# Patient Record
Sex: MALE | URBAN - METROPOLITAN AREA
[De-identification: names, ages, dates, MRNs, and addresses within clinical notes are randomized per-mention and may not be internally consistent; named-entity substitution may affect disease eponyms.]

---

## 2017-05-12 ENCOUNTER — OFFICE VISIT (OUTPATIENT)
Dept: FAMILY MEDICINE | Facility: CLINIC | Age: 42
End: 2017-05-12
Payer: COMMERCIAL

## 2017-05-12 VITALS
BODY MASS INDEX: 30.05 KG/M2 | HEART RATE: 63 BPM | SYSTOLIC BLOOD PRESSURE: 119 MMHG | WEIGHT: 176 LBS | DIASTOLIC BLOOD PRESSURE: 66 MMHG | TEMPERATURE: 98.3 F | OXYGEN SATURATION: 100 % | HEIGHT: 64 IN

## 2017-05-12 DIAGNOSIS — J06.9 UPPER RESPIRATORY TRACT INFECTION, UNSPECIFIED TYPE: Primary | ICD-10-CM

## 2017-05-12 LAB
BASOPHILS # BLD AUTO: 0 10E9/L (ref 0–0.2)
BASOPHILS NFR BLD AUTO: 1.2 %
DIFFERENTIAL METHOD BLD: ABNORMAL
EOSINOPHIL # BLD AUTO: 0.2 10E9/L (ref 0–0.7)
EOSINOPHIL NFR BLD AUTO: 4.6 %
ERYTHROCYTE [DISTWIDTH] IN BLOOD BY AUTOMATED COUNT: 13.8 % (ref 10–15)
HCT VFR BLD AUTO: 47 % (ref 40–53)
HGB BLD-MCNC: 15.9 G/DL (ref 13.3–17.7)
LYMPHOCYTES # BLD AUTO: 1.5 10E9/L (ref 0.8–5.3)
LYMPHOCYTES NFR BLD AUTO: 44.5 %
MCH RBC QN AUTO: 26.6 PG (ref 26.5–33)
MCHC RBC AUTO-ENTMCNC: 33.8 G/DL (ref 31.5–36.5)
MCV RBC AUTO: 79 FL (ref 78–100)
MONOCYTES # BLD AUTO: 0.4 10E9/L (ref 0–1.3)
MONOCYTES NFR BLD AUTO: 13.5 %
NEUTROPHILS # BLD AUTO: 1.2 10E9/L (ref 1.6–8.3)
NEUTROPHILS NFR BLD AUTO: 36.2 %
PLATELET # BLD AUTO: 209 10E9/L (ref 150–450)
RBC # BLD AUTO: 5.97 10E12/L (ref 4.4–5.9)
WBC # BLD AUTO: 3.3 10E9/L (ref 4–11)

## 2017-05-12 PROCEDURE — 85025 COMPLETE CBC W/AUTO DIFF WBC: CPT | Performed by: FAMILY MEDICINE

## 2017-05-12 PROCEDURE — 99213 OFFICE O/P EST LOW 20 MIN: CPT | Performed by: FAMILY MEDICINE

## 2017-05-12 PROCEDURE — 36415 COLL VENOUS BLD VENIPUNCTURE: CPT | Performed by: FAMILY MEDICINE

## 2017-05-12 RX ORDER — NAPROXEN 500 MG/1
500 TABLET ORAL 2 TIMES DAILY WITH MEALS
Qty: 20 TABLET | Refills: 0 | Status: SHIPPED | OUTPATIENT
Start: 2017-05-12 | End: 2017-09-27

## 2017-05-12 RX ORDER — NAPROXEN 500 MG/1
500 TABLET ORAL 2 TIMES DAILY WITH MEALS
Qty: 20 TABLET | Refills: 0 | Status: SHIPPED | OUTPATIENT
Start: 2017-05-12 | End: 2017-05-12

## 2017-05-12 NOTE — PATIENT INSTRUCTIONS
1. Your blood test showed low white blood cell count. This is common with a viral infection.    2. It should get better with time.    3. Take Naproxen 500 mg twice per day for 5 days.    4. Let me know if not better in one week.    5. Come back to see me for a physical without eating or drinking anything other than water.

## 2017-05-12 NOTE — NURSING NOTE
"Chief Complaint   Patient presents with     Cough     x 4 days     Arthritis     joint pain     Flank Pain       Initial /66 (Cuff Size: Adult Regular)  Pulse 63  Temp 98.3  F (36.8  C) (Oral)  Ht 5' 3.5\" (1.613 m)  Wt 176 lb (79.8 kg)  SpO2 100%  BMI 30.69 kg/m2 Estimated body mass index is 30.69 kg/(m^2) as calculated from the following:    Height as of this encounter: 5' 3.5\" (1.613 m).    Weight as of this encounter: 176 lb (79.8 kg).  Medication Reconciliation: complete   Staff was masked during visit.    Blaire Beach LPN    "

## 2017-05-12 NOTE — MR AVS SNAPSHOT
After Visit Summary   5/12/2017    Aubree Le    MRN: [id number]           Patient Information     Date Of Birth          1975        Visit Information        Provider Department      5/12/2017 11:10 AM Kareem Charles MD [facility]        Today's Diagnoses     Upper respiratory tract infection, unspecified type    -  1      Care Instructions    1. Your blood test showed low white blood cell count. This is common with a viral infection.    2. It should get better with time.    3. Take Naproxen 500 mg twice per day for 5 days.    4. Let me know if not better in one week.    5. Come back to see me for a physical without eating or drinking anything other than water.        Follow-ups after your visit        Your next 10 appointments already scheduled     May 12, 2017 11:10 AM CDT   Office Visit with Kareem Charles MD   [facility]    [address] [facility] [address] [phone]           Bring a current list of meds and any records pertaining to this visit.  For Physicals, please bring immunization records and any forms needing to be filled out.  Please arrive 10 minutes early to complete paperwork.              Who to contact     If you have questions or need follow up information about today's clinic visit or your schedule please contact [facility] directly at [phone].  Normal or non-critical lab and imaging results will be communicated to you by MyChart, letter or phone within 4 business days after the clinic has received the results. If you do not hear from us within 7 days, please contact the clinic through MyChart or phone. If you have a critical or abnormal lab result, we will notify you by phone as soon as possible.  Submit refill requests through [employer] or call your pharmacy and they will forward the refill request to us. Please allow 3 business days for your refill to be completed.           "Additional Information About Your Visit        MyChart Information     [employer] lets you send messages to your doctor, view your test results, renew your prescriptions, schedule appointments and more. To sign up, go to [email or web address] . Click on \"Log in\" on the left side of the screen, which will take you to the Welcome page. Then click on \"Sign up Now\" on the right side of the page.     You will be asked to enter the access code listed below, as well as some personal information. Please follow the directions to create your username and password.     Your access code is: [email or web address]  Expires: 8/10/2017 11:03 AM     Your access code will  in 90 days. If you need help or a new code, please call your [place] clinic or [phone].        Care EveryWhere ID     This is your Care EveryWhere ID. This could be used by other organizations to access your [place] medical records  [id number]        Your Vitals Were     Pulse Temperature Height Pulse Oximetry BMI (Body Mass Index)       63 98.3  F (36.8  C) (Oral) 5' 3.5\" (1.613 m) 100% 30.69 kg/m2        Blood Pressure from Last 3 Encounters:   17 119/66   16 116/77    Weight from Last 3 Encounters:   17 176 lb (79.8 kg)   16 180 lb (81.6 kg)              We Performed the Following     CBC with platelets differential        Primary Care Provider Office Phone # Fax #    Kareem Charles [phone]       [facility] [address] [facility] [address]        Thank you!     Thank you for choosing [facility]  for your care. Our goal is always to provide you with excellent care. Hearing back from our patients is one way we can continue to improve our services. Please take a few minutes to complete the written survey that you may receive in the mail after your visit with us. Thank you!             Your Updated Medication List - Protect others around you: Learn how to safely use, store and throw " away your medicines at www.disposemymeds.org.      Notice  As of 5/12/2017 11:03 AM    You have not been prescribed any medications.

## 2017-05-12 NOTE — PROGRESS NOTES
"HPI:    Aubree Le is an 41 year old male who presents for evaluation of cold symptoms.    Duration: cough started 4 days ago and joint pain started 2 days ago  Fever: no  Cough: Productive of non bloody sputum  Shortness of breath: no  Sore throat: no  Runny nose: yes  Nasal congestion: yes  Ear pain: no  Other symptoms: joint aches without swelling on the elbows and wrists. No rashes.    Results for orders placed or performed in visit on 05/12/17   CBC with platelets differential   Result Value Ref Range    WBC 3.3 (L) 4.0 - 11.0 10e9/L    RBC Count 5.97 (H) 4.4 - 5.9 10e12/L    Hemoglobin 15.9 13.3 - 17.7 g/dL    Hematocrit 47.0 40.0 - 53.0 %    MCV 79 78 - 100 fl    MCH 26.6 26.5 - 33.0 pg    MCHC 33.8 31.5 - 36.5 g/dL    RDW 13.8 10.0 - 15.0 %    Platelet Count 209 150 - 450 10e9/L    Diff Method Automated Method     % Neutrophils 36.2 %    % Lymphocytes 44.5 %    % Monocytes 13.5 %    % Eosinophils 4.6 %    % Basophils 1.2 %    Absolute Neutrophil 1.2 (L) 1.6 - 8.3 10e9/L    Absolute Lymphocytes 1.5 0.8 - 5.3 10e9/L    Absolute Monocytes 0.4 0.0 - 1.3 10e9/L    Absolute Eosinophils 0.2 0.0 - 0.7 10e9/L    Absolute Basophils 0.0 0.0 - 0.2 10e9/L       ROS:    Per HPI    Exam:    /66 (Cuff Size: Adult Regular)  Pulse 63  Temp 98.3  F (36.8  C) (Oral)  Ht 5' 3.5\" (1.613 m)  Wt 176 lb (79.8 kg)  SpO2 100%  BMI 30.69 kg/m2  Gen: Healthy appearing male in no acute distress  ENT: TM's normal. Oropharynx normal. Oral mucosa moist without lesions.  Eyes: Conjunctiva and sclera normal. Pupils react normally to light. No nystagmus.  Neck: No enlarged lymph nodes, thyromegally or other masses.  Lungs: Good air movement and otherwise clear.  CV: Heart RRR with no murmurs. No JVD, carotid bruits or leg edema.  MS: the fingers, wrists and elbows with full ROM without swelling.     Assessment and Plan - Decision Making    1. Upper respiratory tract infection, unspecified type  See below.  - CBC with platelets " differential  - naproxen (NAPROSYN) 500 MG tablet; Take 1 tablet (500 mg) by mouth 2 times daily (with meals)  Dispense: 20 tablet; Refill: 0      Written instructions given as follows:    Patient Instructions   1. Your blood test showed low white blood cell count. This is common with a viral infection.    2. It should get better with time.    3. Take Naproxen 500 mg twice per day for 5 days.    4. Let me know if not better in one week.    5. Come back to see me for a physical without eating or drinking anything other than water.

## 2017-09-27 ENCOUNTER — OFFICE VISIT (OUTPATIENT)
Dept: FAMILY MEDICINE | Facility: CLINIC | Age: 42
End: 2017-09-27
Payer: COMMERCIAL

## 2017-09-27 ENCOUNTER — TELEPHONE (OUTPATIENT)
Dept: FAMILY MEDICINE | Facility: CLINIC | Age: 42
End: 2017-09-27

## 2017-09-27 VITALS
HEART RATE: 61 BPM | SYSTOLIC BLOOD PRESSURE: 116 MMHG | DIASTOLIC BLOOD PRESSURE: 70 MMHG | BODY MASS INDEX: 31.21 KG/M2 | OXYGEN SATURATION: 99 % | WEIGHT: 179 LBS | TEMPERATURE: 98.8 F

## 2017-09-27 DIAGNOSIS — K12.0 CANKER SORES ORAL: Primary | ICD-10-CM

## 2017-09-27 DIAGNOSIS — R10.9 LEFT FLANK PAIN: Primary | ICD-10-CM

## 2017-09-27 DIAGNOSIS — K12.0 CANKER SORES ORAL: ICD-10-CM

## 2017-09-27 PROBLEM — B00.1 COLD SORE: Status: ACTIVE | Noted: 2017-09-27

## 2017-09-27 LAB
ALBUMIN SERPL-MCNC: 4 G/DL (ref 3.4–5)
ALBUMIN UR-MCNC: NEGATIVE MG/DL
ALP SERPL-CCNC: 56 U/L (ref 40–150)
ALT SERPL W P-5'-P-CCNC: 27 U/L (ref 0–70)
ANION GAP SERPL CALCULATED.3IONS-SCNC: 8 MMOL/L (ref 3–14)
APPEARANCE UR: CLEAR
AST SERPL W P-5'-P-CCNC: 17 U/L (ref 0–45)
BASOPHILS # BLD AUTO: 0 10E9/L (ref 0–0.2)
BASOPHILS NFR BLD AUTO: 0.6 %
BILIRUB SERPL-MCNC: 0.5 MG/DL (ref 0.2–1.3)
BILIRUB UR QL STRIP: NEGATIVE
BUN SERPL-MCNC: 12 MG/DL (ref 7–30)
CALCIUM SERPL-MCNC: 9 MG/DL (ref 8.5–10.1)
CHLORIDE SERPL-SCNC: 104 MMOL/L (ref 94–109)
CO2 SERPL-SCNC: 26 MMOL/L (ref 20–32)
COLOR UR AUTO: YELLOW
CREAT SERPL-MCNC: 0.84 MG/DL (ref 0.66–1.25)
DIFFERENTIAL METHOD BLD: ABNORMAL
EOSINOPHIL # BLD AUTO: 0.2 10E9/L (ref 0–0.7)
EOSINOPHIL NFR BLD AUTO: 3.9 %
ERYTHROCYTE [DISTWIDTH] IN BLOOD BY AUTOMATED COUNT: 14 % (ref 10–15)
ERYTHROCYTE [SEDIMENTATION RATE] IN BLOOD BY WESTERGREN METHOD: 7 MM/H (ref 0–15)
GFR SERPL CREATININE-BSD FRML MDRD: >90 ML/MIN/1.7M2
GLUCOSE SERPL-MCNC: 83 MG/DL (ref 70–99)
GLUCOSE UR STRIP-MCNC: NEGATIVE MG/DL
HCT VFR BLD AUTO: 45.2 % (ref 40–53)
HGB BLD-MCNC: 15.6 G/DL (ref 13.3–17.7)
HGB UR QL STRIP: NEGATIVE
KETONES UR STRIP-MCNC: NEGATIVE MG/DL
LEUKOCYTE ESTERASE UR QL STRIP: NEGATIVE
LYMPHOCYTES # BLD AUTO: 2.1 10E9/L (ref 0.8–5.3)
LYMPHOCYTES NFR BLD AUTO: 44.6 %
MCH RBC QN AUTO: 26.7 PG (ref 26.5–33)
MCHC RBC AUTO-ENTMCNC: 34.5 G/DL (ref 31.5–36.5)
MCV RBC AUTO: 77 FL (ref 78–100)
MONOCYTES # BLD AUTO: 0.4 10E9/L (ref 0–1.3)
MONOCYTES NFR BLD AUTO: 8.8 %
NEUTROPHILS # BLD AUTO: 2 10E9/L (ref 1.6–8.3)
NEUTROPHILS NFR BLD AUTO: 42.1 %
NITRATE UR QL: NEGATIVE
PH UR STRIP: 5.5 PH (ref 5–7)
PLATELET # BLD AUTO: 189 10E9/L (ref 150–450)
POTASSIUM SERPL-SCNC: 3.9 MMOL/L (ref 3.4–5.3)
PROT SERPL-MCNC: 8.2 G/DL (ref 6.8–8.8)
RBC # BLD AUTO: 5.84 10E12/L (ref 4.4–5.9)
SODIUM SERPL-SCNC: 138 MMOL/L (ref 133–144)
SOURCE: NORMAL
SP GR UR STRIP: 1.02 (ref 1–1.03)
UROBILINOGEN UR STRIP-ACNC: 0.2 EU/DL (ref 0.2–1)
WBC # BLD AUTO: 4.6 10E9/L (ref 4–11)

## 2017-09-27 PROCEDURE — 36415 COLL VENOUS BLD VENIPUNCTURE: CPT | Performed by: FAMILY MEDICINE

## 2017-09-27 PROCEDURE — 80053 COMPREHEN METABOLIC PANEL: CPT | Performed by: FAMILY MEDICINE

## 2017-09-27 PROCEDURE — 85652 RBC SED RATE AUTOMATED: CPT | Performed by: FAMILY MEDICINE

## 2017-09-27 PROCEDURE — 81003 URINALYSIS AUTO W/O SCOPE: CPT | Performed by: FAMILY MEDICINE

## 2017-09-27 PROCEDURE — 85025 COMPLETE CBC W/AUTO DIFF WBC: CPT | Performed by: FAMILY MEDICINE

## 2017-09-27 PROCEDURE — 99214 OFFICE O/P EST MOD 30 MIN: CPT | Performed by: FAMILY MEDICINE

## 2017-09-27 RX ORDER — TRIAMCINOLONE ACETONIDE 0.1 %
PASTE (GRAM) DENTAL 2 TIMES DAILY PRN
Qty: 5 G | Refills: 1 | Status: SHIPPED | OUTPATIENT
Start: 2017-09-27

## 2017-09-27 NOTE — NURSING NOTE
"Chief Complaint   Patient presents with     Fever       Initial /70 (Cuff Size: Adult Regular)  Pulse 61  Temp 98.8  F (37.1  C) (Oral)  Wt 179 lb (81.2 kg)  SpO2 99%  BMI 31.21 kg/m2 Estimated body mass index is 31.21 kg/(m^2) as calculated from the following:    Height as of 5/12/17: 5' 3.5\" (1.613 m).    Weight as of this encounter: 179 lb (81.2 kg).  Medication Reconciliation: complete   Staff was masked during visit.    Blaire Beach LPN    "

## 2017-09-27 NOTE — PATIENT INSTRUCTIONS
1. Use the paste on the sores inside the mouth as needed up to twice per day.    2. I will contact you with results of your tests.

## 2017-09-27 NOTE — MR AVS SNAPSHOT
After Visit Summary   9/27/2017    Aubree Le    MRN: [id number]           Patient Information     Date Of Birth          1975        Visit Information        Provider Department      9/27/2017 2:50 PM Kareem Charles MD [facility]        Today's Diagnoses     Left flank pain    -  1    Canker sores oral          Care Instructions    1. Use the paste on the sores inside the mouth as needed up to twice per day.    2. I will contact you with results of your tests.          Follow-ups after your visit        Who to contact     If you have questions or need follow up information about today's clinic visit or your schedule please contact [facility] directly at [phone].  Normal or non-critical lab and imaging results will be communicated to you by MyChart, letter or phone within 4 business days after the clinic has received the results. If you do not hear from us within 7 days, please contact the clinic through [employer] or phone. If you have a critical or abnormal lab result, we will notify you by phone as soon as possible.  Submit refill requests through [employer] or call your pharmacy and they will forward the refill request to us. Please allow 3 business days for your refill to be completed.          Additional Information About Your Visit        MyChart Information     [employer] gives you secure access to your electronic health record. If you see a primary care provider, you can also send messages to your care team and make appointments. If you have questions, please call your primary care clinic.  If you do not have a primary care provider, please call [phone] and they will assist you.        Care EveryWhere ID     This is your Care EveryWhere ID. This could be used by other organizations to access your [place] medical records  [id number]        Your Vitals Were     Pulse Temperature Pulse Oximetry BMI (Body Mass Index)          61 98.8  F (37.1  C) (Oral)  99% 31.21 kg/m2         Blood Pressure from Last 3 Encounters:   09/27/17 116/70   05/12/17 119/66   12/16/16 116/77    Weight from Last 3 Encounters:   09/27/17 179 lb (81.2 kg)   05/12/17 176 lb (79.8 kg)   12/16/16 180 lb (81.6 kg)              We Performed the Following     CBC with platelets differential     Comprehensive metabolic panel     Erythrocyte sedimentation rate auto     UA reflex to Microscopic and Culture          Today's Medication Changes          These changes are accurate as of: 9/27/17  3:41 PM.  If you have any questions, ask your nurse or doctor.               Start taking these medicines.        Dose/Directions    triamcinolone 0.1 % paste   Commonly known as:  KENALOG   Used for:  Canker sores oral   Started by:  Kareem Charles MD        Take by mouth 2 times daily as needed   Quantity:  5 g   Refills:  1            Where to get your medicines      These medications [facility] [address] [facility] [address]  [id number] [place] [id number]     Phone:  [phone]     triamcinolone 0.1 % paste                Primary Care Provider Office Phone # Fax #    Kareem Charles [phone] [address] [facility] [address]        Equal Access to Services     GRANT GONZALEZ AH: Hadii giovana ku hadasho Soomaali, waaxda luqadaha, qaybta kaalmada adeegyada, carmen sanches haykendran ammy cardona. So [facility] [phone].    ATENCIÓN: Si habla español, tiene a rosado disposición servicios gratuitos de asistencia lingüística. Llame al [phone].    We comply with applicable federal civil rights laws and [place] laws. We do not discriminate on the basis of race, color, national origin, age, disability sex, sexual orientation or gender identity.            Thank you!     Thank you for choosing [facility]  for your care. Our goal is always to provide you with excellent care. Hearing back from our patients is one  way we can continue to improve our services. Please take a few minutes to complete the written survey that you may receive in the mail after your visit with us. Thank you!             Your Updated Medication List - Protect others around you: Learn how to safely use, store and throw away your medicines at www.disposemymeds.org.          This list is accurate as of: 9/27/17  3:41 PM.  Always use your most recent med list.                   Brand Name Dispense Instructions for use Diagnosis    triamcinolone 0.1 % paste    KENALOG    5 g    Take by mouth 2 times daily as needed    Canker sores oral

## 2017-09-27 NOTE — PROGRESS NOTES
HPI:    Aubree is an 42 year old male who presents for:    Left flank pain - present since July 2017 without trauma. The pain is mild and intermittent. The pain does not radiate to the legs. Not associated with hematuria or other urinary symptoms. Denies constipation. No numbness, fevers or weight loss. But sometimes he feels chilled.  Evaluation and treatment:    Diff dx includes renal stones, UTI, musculoskeletal issues.   We will start with CBC, ESR, CMP and u/a.    Canker sores - he has had intermittent sores in on the inside of his lips and mouth that last 2-3 days and then resolve spontaneously. They are not there at this time.  Evaluation and treatment:    The exam is normal today but based on his description I think these are canker sores.   Try Triamcinolone orabase prn.    Preventive -     ROS:    Const: No fevers, weight changes or night sweats recently.  ENT: No runny nose, sore throat or ear pain.  Resp: No cough or shortness of breath.  CV: No chest pain, dizziness or cardiac palpitations.  GI: No nausea, vomiting, diarrhea or constipation. Denies blood in stools or black stools.  : No dysuria, frequency or hematuria.  The rest of the ROS negative, other than listed on HPI    Social History     Social History     Marital status:      Spouse name: N/A     Number of children: N/A     Years of education: N/A     Occupational History     Not on file.     Social History Main Topics     Smoking status: Never Smoker     Smokeless tobacco: Never Used     Alcohol use No     Drug use: No     Sexual activity: Yes     Partners: Female     Other Topics Concern     Not on file     Social History Narrative       Exam:    /70 (Cuff Size: Adult Regular)  Pulse 61  Temp 98.8  F (37.1  C) (Oral)  Wt 179 lb (81.2 kg)  SpO2 99%  BMI 31.21 kg/m2    Gen: Healthy appearing male in no acute distress  ENT: TM's normal. Oropharynx normal. Oral mucosa moist without lesions.  Eyes: Conjunctiva and sclera  normal. Pupils react normally to light. No nystagmus.  Neck: No enlarged lymph nodes, thyromegally or other masses.  Lungs: Good air movement and otherwise clear.  CV: Heart RRR with no murmurs. No JVD, carotid bruits or leg edema.  Abd: Soft, non tender, non distended, with normal bowel sounds. No liver or spleen enlargement. No masses. No hernias.  MS: left flank area without tenderness.    Assessment and Plan - Decision Making    1. Left flank pain  Per HPI  - CBC with platelets differential  - Erythrocyte sedimentation rate auto  - UA reflex to Microscopic and Culture  - Comprehensive metabolic panel    2. Canker sores oral  Per HPI  - triamcinolone (KENALOG) 0.1 % paste; Take by mouth 2 times daily as needed  Dispense: 5 g; Refill: 1      Written instructions given as follows:    Patient Instructions   1. Use the paste on the sores inside the mouth as needed up to twice per day.    2. I will contact you with results of your tests.

## 2017-09-29 NOTE — PROGRESS NOTES
Hello Primus,    The size of the red blood cells was a bit small. But this is not worrisome.    All other labs were normal.    Regards,    Kareem Charles M.D.

## 2017-12-06 ENCOUNTER — TELEPHONE (OUTPATIENT)
Dept: FAMILY MEDICINE | Facility: CLINIC | Age: 42
End: 2017-12-06

## 2017-12-06 NOTE — TELEPHONE ENCOUNTER
Pt had a positive TB test and chest x ray and was advised to see a pulmonologist.  He would like to see one in [place].  Advised to contact insurance to see who would be covered in [place] and if referral is needed.  If referral is needed then let us know provider's name and we will do referral.  Berta Hidalgo RN

## 2017-12-06 NOTE — TELEPHONE ENCOUNTER
Patient is currently in [facility] for work, needs a referral for a pulmonologist please call to discuss.

## 2017-12-07 NOTE — PROGRESS NOTES
SUBJECTIVE:                                                    Aubree Le is a 42 year old male who presents to clinic today for the following health issues:    Positive TB gold results and negative xray done In [place].  Patient is in [facility] and has form with today he needs help with filling out per patient.     Patient is from [place] and states that he had BCG vaccine. He had positive TBT test understandably in [place] recently and subsequent negative cxr done.  The quant gold was also positive and therefore he was told to be seen by specialist. Paperwork states he needs to see PULM OR ID and needs to be treated with anti-tb medications unless contraindicated.  He has no fevers, weight loss, or cough.  Possible latent TB or just reactive due to having the vaccine.  W           Problem list and histories reviewed & adjusted, as indicated.  Additional history: as documented    Patient Active Problem List   Diagnosis     Canker sores oral     Past Surgical History:   Procedure Laterality Date     NO HISTORY OF SURGERY         Social History   Substance Use Topics     Smoking status: Never Smoker     Smokeless tobacco: Never Used     Alcohol use No     History reviewed. No pertinent family history.      Current Outpatient Prescriptions   Medication Sig Dispense Refill     benzocaine (ORAJEL MAXIMUM STRENGTH) 20 % GEL gel Take by mouth 4 times daily as needed for moderate pain 5 g 3     triamcinolone (KENALOG) 0.1 % paste Take by mouth 2 times daily as needed 5 g 1     No Known Allergies      ROS:  Constitutional, HEENT, cardiovascular, pulmonary, gi and gu systems are negative, except as otherwise noted.      OBJECTIVE:   /74  Pulse 78  Temp 97.9  F (36.6  C) (Oral)  Wt 184 lb (83.5 kg)  SpO2 98%  BMI 32.08 kg/m2  Body mass index is 32.08 kg/(m^2).  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

## 2017-12-11 ENCOUNTER — OFFICE VISIT (OUTPATIENT)
Dept: FAMILY MEDICINE | Facility: CLINIC | Age: 42
End: 2017-12-11
Payer: MEDICAID

## 2017-12-11 VITALS
WEIGHT: 184 LBS | SYSTOLIC BLOOD PRESSURE: 126 MMHG | BODY MASS INDEX: 32.08 KG/M2 | OXYGEN SATURATION: 98 % | HEART RATE: 78 BPM | DIASTOLIC BLOOD PRESSURE: 74 MMHG | TEMPERATURE: 97.9 F

## 2017-12-11 DIAGNOSIS — R76.12 POSITIVE QUANTIFERON-TB GOLD TEST: Primary | ICD-10-CM

## 2017-12-11 DIAGNOSIS — Z92.29 HISTORY OF BCG VACCINATION: ICD-10-CM

## 2017-12-11 PROCEDURE — 99213 OFFICE O/P EST LOW 20 MIN: CPT | Performed by: PHYSICIAN ASSISTANT

## 2017-12-11 NOTE — NURSING NOTE
"Chief Complaint   Patient presents with     Results     + TB gold and will bring in papers       Initial /74  Pulse 78  Temp 97.9  F (36.6  C) (Oral)  Wt 184 lb (83.5 kg)  SpO2 98%  BMI 32.08 kg/m2 Estimated body mass index is 32.08 kg/(m^2) as calculated from the following:    Height as of 5/12/17: 5' 3.5\" (1.613 m).    Weight as of this encounter: 184 lb (83.5 kg).  Medication Reconciliation: complete      Josie Galindo MA    "

## 2017-12-11 NOTE — MR AVS SNAPSHOT
After Visit Summary   12/11/2017    Aubree Le    MRN: [id number]           Patient Information     Date Of Birth          1975        Visit Information        Provider Department      12/11/2017 7:20 AM Sylvie Garcia PA-C [facility]        Today's Diagnoses     Positive QuantiFERON-TB Gold test    -  1    History of BCG vaccination           Follow-ups after your visit        Additional Services     INFECTIOUS DISEASE REFERRAL       Your provider has referred you to: [facility]: Adult Specialty [facility] Infusion [facility] - [place] [phone]   [email or web address]    Please be aware that coverage of these services is subject to the terms and limitations of your health insurance plan.  Call member services at your health plan with any benefit or coverage questions.      Please bring the following with you to your appointment:    (1) Any X-Rays, CTs or MRIs which have been performed.  Contact the facility where they were done to arrange for  prior to your scheduled appointment.    (2) List of current medications   (3) This referral request   (4) Any documents/labs given to you for this referral            INFECTIOUS DISEASE REFERRAL       Your provider has referred you to: [facility]: [facility] (Infectious Disease and HIV Clinic) - [place] [phone]   [email or web address]    Please be aware that coverage of these services is subject to the terms and limitations of your health insurance plan.  Call member services at your health plan with any benefit or coverage questions.      Please bring the following with you to your appointment:    (1) Any X-Rays, CTs or MRIs which have been performed.  Contact the facility where they were done to arrange for  prior to your scheduled appointment.    (2) List of current medications   (3) This referral  request   (4) Any documents/labs given to you for this referral            INFECTIOUS DISEASE REFERRAL       Your provider has referred you to: Dr. Indu Lu [facility] phone     Please be aware that coverage of these services is subject to the terms and limitations of your health insurance plan.  Call member services at your health plan with any benefit or coverage questions.      Please bring the following with you to your appointment:    (1) Any X-Rays, CTs or MRIs which have been performed.  Contact the facility where they were done to arrange for  prior to your scheduled appointment.    (2) List of current medications   (3) This referral request   (4) Any documents/labs given to you for this referral                  Who to contact     If you have questions or need follow up information about today's clinic visit or your schedule please contact [facility] directly at [phone].  Normal or non-critical lab and imaging results will be communicated to you by MyChart, letter or phone within 4 business days after the clinic has received the results. If you do not hear from us within 7 days, please contact the clinic through MyChart or phone. If you have a critical or abnormal lab result, we will notify you by phone as soon as possible.  Submit refill requests through [employer] or call your pharmacy and they will forward the refill request to us. Please allow 3 business days for your refill to be completed.          Additional Information About Your Visit        MyChart Information     [employer] gives you secure access to your electronic health record. If you see a primary care provider, you can also send messages to your care team and make appointments. If you have questions, please call your primary care clinic.  If you do not have a primary care provider, please call [phone] and they will assist you.        Care EveryWhere ID     This is your Care EveryWhere ID.  This could be used by other organizations to access your [place] medical records  [id number]        Your Vitals Were     Pulse Temperature Pulse Oximetry BMI (Body Mass Index)          78 97.9  F (36.6  C) (Oral) 98% 32.08 kg/m2         Blood Pressure from Last 3 Encounters:   12/11/17 126/74   09/27/17 116/70   05/12/17 119/66    Weight from Last 3 Encounters:   12/11/17 184 lb (83.5 kg)   09/27/17 179 lb (81.2 kg)   05/12/17 176 lb (79.8 kg)              We Performed the Following     INFECTIOUS DISEASE REFERRAL     INFECTIOUS DISEASE REFERRAL     INFECTIOUS DISEASE REFERRAL        Primary Care Provider Office Phone # Fax #    Kareem Charles [phone] [address] [facility] [address]        Equal Access to Services     ROSE MARIE GONZALEZ : Hadii giovana you hadasho Sogarima, waaxda luqadaha, qaybta kaalmada adeegyada, carmen snyder . So [facility] [phone].    ATENCIÓN: Si habla español, tiene a rosado disposición servicios gratuitos de asistencia lingüística. Chiki al [phone].    We comply with applicable federal civil rights laws and [place] laws. We do not discriminate on the basis of race, color, national origin, age, disability, sex, sexual orientation, or gender identity.            Thank you!     Thank you for choosing [facility]  for your care. Our goal is always to provide you with excellent care. Hearing back from our patients is one way we can continue to improve our services. Please take a few minutes to complete the written survey that you may receive in the mail after your visit with us. Thank you!             Your Updated Medication List - Protect others around you: Learn how to safely use, store and throw away your medicines at www.disposemymeds.org.          This list is accurate as of: 12/11/17  8:09 AM.  Always use your most recent med list.                   Brand Name Dispense Instructions for use Diagnosis    benzocaine 20 %  Gel gel    ORAJEL MAXIMUM STRENGTH    5 g    Take by mouth 4 times daily as needed for moderate pain    Canker sores oral       triamcinolone 0.1 % paste    KENALOG    5 g    Take by mouth 2 times daily as needed    Canker sores oral

## 2017-12-14 ENCOUNTER — TELEPHONE (OUTPATIENT)
Dept: FAMILY MEDICINE | Facility: CLINIC | Age: 42
End: 2017-12-14

## 2017-12-14 NOTE — TELEPHONE ENCOUNTER
Called and spoke to patient. I informed him that I requested records from his clinic in [place] on Monday, but I have not got any records yet. I told him to contact his clinic in [place] to have them send the records right to Dr Griggs.Deanna Cornejo MA/SHERRY

## 2017-12-14 NOTE — TELEPHONE ENCOUNTER
Patient is calling to request results of TB gold test to be sent to [facility] phone: [phone] ATTN: Dr Griggs. Thank you. (Patient did not have fax number.)

## 2018-07-13 ENCOUNTER — DOCUMENTATION ONLY (OUTPATIENT)
Dept: LAB | Facility: CLINIC | Age: 43
End: 2018-07-13

## 2018-07-13 DIAGNOSIS — Z13.220 SCREENING FOR CHOLESTEROL LEVEL: Primary | ICD-10-CM

## 2018-07-13 DIAGNOSIS — Z13.1 SCREENING FOR DIABETES MELLITUS: ICD-10-CM

## 2018-07-13 DIAGNOSIS — Z00.00 ROUTINE HISTORY AND PHYSICAL EXAMINATION OF ADULT: ICD-10-CM

## 2018-07-18 DIAGNOSIS — Z13.220 SCREENING FOR CHOLESTEROL LEVEL: ICD-10-CM

## 2018-07-18 DIAGNOSIS — Z13.1 SCREENING FOR DIABETES MELLITUS: ICD-10-CM

## 2018-07-18 DIAGNOSIS — Z00.00 ROUTINE HISTORY AND PHYSICAL EXAMINATION OF ADULT: ICD-10-CM

## 2018-07-18 LAB
CHOLEST SERPL-MCNC: 226 MG/DL
GLUCOSE SERPL-MCNC: 95 MG/DL (ref 70–99)
HDLC SERPL-MCNC: 52 MG/DL
LDLC SERPL CALC-MCNC: 161 MG/DL
NONHDLC SERPL-MCNC: 174 MG/DL
TRIGL SERPL-MCNC: 66 MG/DL

## 2018-07-18 PROCEDURE — 36415 COLL VENOUS BLD VENIPUNCTURE: CPT | Performed by: FAMILY MEDICINE

## 2018-07-18 PROCEDURE — 82947 ASSAY GLUCOSE BLOOD QUANT: CPT | Performed by: FAMILY MEDICINE

## 2018-07-18 PROCEDURE — 80061 LIPID PANEL: CPT | Performed by: FAMILY MEDICINE

## 2018-07-23 ENCOUNTER — OFFICE VISIT (OUTPATIENT)
Dept: FAMILY MEDICINE | Facility: CLINIC | Age: 43
End: 2018-07-23
Payer: COMMERCIAL

## 2018-07-23 VITALS
RESPIRATION RATE: 12 BRPM | WEIGHT: 180 LBS | DIASTOLIC BLOOD PRESSURE: 69 MMHG | OXYGEN SATURATION: 98 % | BODY MASS INDEX: 30.73 KG/M2 | HEIGHT: 64 IN | SYSTOLIC BLOOD PRESSURE: 138 MMHG | TEMPERATURE: 98.6 F | HEART RATE: 72 BPM

## 2018-07-23 DIAGNOSIS — Z00.00 ROUTINE GENERAL MEDICAL EXAMINATION AT A HEALTH CARE FACILITY: Primary | ICD-10-CM

## 2018-07-23 DIAGNOSIS — R10.84 ABDOMINAL PAIN, GENERALIZED: ICD-10-CM

## 2018-07-23 PROCEDURE — 99396 PREV VISIT EST AGE 40-64: CPT | Performed by: FAMILY MEDICINE

## 2018-07-23 RX ORDER — PRAZIQUANTEL 600 MG/1
TABLET, FILM COATED ORAL
Qty: 1 TABLET | Refills: 0 | Status: SHIPPED | OUTPATIENT
Start: 2018-07-23

## 2018-07-23 NOTE — PATIENT INSTRUCTIONS
1. I recommend including veggies, fresh fruits (3 to 5 servings), nuts (unsalted) in your daily diet. Cut down on carbs like bread, pasta, rice, potatoes. Cut down on red meats like burgers etc. Increase healthy proteins like beans, tofu, tuna, chicken and turkey.    2. Get regular exercise like jogging, biking, swimming at least 3 times per week.      3. See the dentist and eye doctor regularly.     4. Let me see you back in one year for a physical with fasting labs.

## 2018-07-23 NOTE — MR AVS SNAPSHOT
After Visit Summary   7/23/2018    Aubree Le    MRN: [id number]           Patient Information     Date Of Birth          1975        Visit Information        Provider Department      7/23/2018 10:30 AM Kareem Charles MD [facility]        Today's Diagnoses     Routine general medical examination at a health care facility    -  1    Abdominal pain, generalized          Care Instructions    1. I recommend including veggies, fresh fruits (3 to 5 servings), nuts (unsalted) in your daily diet. Cut down on carbs like bread, pasta, rice, potatoes. Cut down on red meats like burgers etc. Increase healthy proteins like beans, tofu, tuna, chicken and turkey.    2. Get regular exercise like jogging, biking, swimming at least 3 times per week.      3. See the dentist and eye doctor regularly.     4. Let me see you back in one year for a physical with fasting labs.               Follow-ups after your visit        Who to contact     If you have questions or need follow up information about today's clinic visit or your schedule please contact [facility] directly at [phone].  Normal or non-critical lab and imaging results will be communicated to you by [employer], letter or phone within 4 business days after the clinic has received the results. If you do not hear from us within 7 days, please contact the clinic through [employer] or phone. If you have a critical or abnormal lab result, we will notify you by phone as soon as possible.  Submit refill requests through [employer] or call your pharmacy and they will forward the refill request to us. Please allow 3 business days for your refill to be completed.          Additional Information About Your Visit        MyChart Information     [employer] gives you secure access to your electronic health record. If you see a primary care provider, you can also send messages to your care team and make appointments. If you have questions, please  "call your primary care clinic.  If you do not have a primary care provider, please call [phone] and they will assist you.        Care EveryWhere ID     This is your Care EveryWhere ID. This could be used by other organizations to access your [place] medical records  [id number]        Your Vitals Were     Pulse Temperature Respirations Height Pulse Oximetry BMI (Body Mass Index)    72 98.6  F (37  C) (Oral) 12 5' 3.5\" (1.613 m) 98% 31.39 kg/m2       Blood Pressure from Last 3 Encounters:   07/23/18 138/69   12/11/17 126/74   09/27/17 116/70    Weight from Last 3 Encounters:   07/23/18 180 lb (81.6 kg)   12/11/17 184 lb (83.5 kg)   09/27/17 179 lb (81.2 kg)              Today, you had the following     No orders found for display         Today's Medication Changes          These changes are accurate as of 7/23/18 11:24 AM.  If you have any questions, ask your nurse or doctor.               Start taking these medicines.        Dose/Directions    praziquantel 600 MG tablet   Commonly known as:  BILTRICIDE   Used for:  Abdominal pain, generalized   Started by:  Kareem Charles MD        Take once   Quantity:  1 tablet   Refills:  0            Where to get your medicines      Some of these will need a paper prescription and others can be bought over the counter.  Ask your nurse if you have questions.     Bring a paper prescription for each of these medications     praziquantel 600 MG tablet                Primary Care Provider Office Phone # Fax #    Kareem Charles [phone] [address] [address] [facility] [address]        Equal Access to Services     [facility] : Hadii giovana you hadasho Sogarima, waaxda luqadaha, qaybta kaalmada carmen fofana. So [facility] [phone].    ATENCIÓN: Si habla español, tiene a rosado disposición servicios gratuitos de asistencia lingüística. Llame al [phone].    We comply with applicable federal civil rights laws and " [place] laws. We do not discriminate on the basis of race, color, national origin, age, disability, sex, sexual orientation, or gender identity.            Thank you!     Thank you for choosing [facility]  for your care. Our goal is always to provide you with excellent care. Hearing back from our patients is one way we can continue to improve our services. Please take a few minutes to complete the written survey that you may receive in the mail after your visit with us. Thank you!             Your Updated Medication List - Protect others around you: Learn how to safely use, store and throw away your medicines at www.disposemymeds.org.          This list is accurate as of 7/23/18 11:24 AM.  Always use your most recent med list.                   Brand Name Dispense Instructions for use Diagnosis    benzocaine 20 % Gel gel    ORAJEL MAXIMUM STRENGTH    5 g    Take by mouth 4 times daily as needed for moderate pain    Canker sores oral       praziquantel 600 MG tablet    BILTRICIDE    1 tablet    Take once    Abdominal pain, generalized       triamcinolone 0.1 % paste    KENALOG    5 g    Take by mouth 2 times daily as needed    Canker sores oral

## 2018-07-23 NOTE — PROGRESS NOTES
"SUBJECTIVE:   CC: Aubree Le is an 43 year old male who presents for preventative health visit.     Physical   Annual:     Getting at least 3 servings of Calcium per day:  Yes    Bi-annual eye exam:  NO    Dental care twice a year:  NO    Sleep apnea or symptoms of sleep apnea:  None    Diet:  Regular (no restrictions)    Frequency of exercise:  4-5 days/week    Duration of exercise:  15-30 minutes    Taking medications regularly:  Yes    Medication side effects:  None    Additional concerns today:  No    Previous left flank pain - resolved.    Previous Canker sores - resolved.    Abd pain - he says there is occasional \"bite\" in the abdomen. Denies significant pain or other GI issues. But he is a , traveling around the world and says he doesn't always trust the food he eats.  Evaluation and treatment:    He says previously taking tapeworm medication has helped and he requested that.   I prescribed Praziquantel but I told him this may not be the issue.   If he still has further symptoms, he needs to come back for re-evaluation.    Dyslipidemia - no h/o vascular disease like CAD, PAD, CVA and no h/o diabetes.   Evaluation and treatment:    Per ATP, no statin recommended.   Counseling done today regarding healthy weight, diet and exercise.     The 10-year ASCVD risk score (Tevin DC Jr, et al., 2013) is: 1.9%    Values used to calculate the score:      Age: 43 years      Sex: Male      Is Non- : No      Diabetic: No      Tobacco smoker: No      Systolic Blood Pressure: 138 mmHg      Is BP treated: No      HDL Cholesterol: 52 mg/dL      Total Cholesterol: 226 mg/dL      Recent Labs   Lab Test  07/18/18   0834   CHOL  226*   HDL  52   LDL  161*   TRIG  66       Preventive -     Immunization History   Administered Date(s) Administered     Influenza Vaccine IM 3yrs+ 4 Valent IIV4 11/17/2017     TDAP Vaccine (Adacel) 01/01/2016       -STD screen: declines    -diabetes screen:    Lab Results " "  Component Value Date    GLC 95 07/18/2018       SH:    Marital status:   Kids: 2  Employment:  - lots of traveling - all around the world  Exercise: active work, plays with his kids  Tobacco: no  Etoh: no  Recreational drugs: no  Caffeine: tea      Today's PHQ-2 Score:   PHQ-2 ( 1999 Pfizer) 7/23/2018   Q1: Little interest or pleasure in doing things 0   Q2: Feeling down, depressed or hopeless 0   PHQ-2 Score 0   Q1: Little interest or pleasure in doing things Not at all   Q2: Feeling down, depressed or hopeless Not at all   PHQ-2 Score 0       Abuse: Current or Past(Physical, Sexual or Emotional)- No  Do you feel safe in your environment - Yes    Social History   Substance Use Topics     Smoking status: Never Smoker     Smokeless tobacco: Never Used     Alcohol use No     Alcohol Use 7/23/2018   If you drink alcohol do you typically have greater than 3 drinks per day OR greater than 7 drinks per week? Not Applicable   No flowsheet data found.    Last PSA: No results found for: PSA        Review of Systems  CONSTITUTIONAL: NEGATIVE for fever, chills, change in weight  INTEGUMENTARY/SKIN: NEGATIVE for worrisome rashes, moles or lesions  EYES: NEGATIVE for vision changes or irritation  ENT: NEGATIVE for ear, mouth and throat problems  RESP: NEGATIVE for significant cough or SOB  CV: NEGATIVE for chest pain, palpitations or peripheral edema  GI: NEGATIVE for nausea, abdominal pain, heartburn, or change in bowel habits   male: negative for dysuria, hematuria, decreased urinary stream, erectile dysfunction, urethral discharge  MUSCULOSKELETAL: NEGATIVE for significant arthralgias or myalgia  NEURO: NEGATIVE for weakness, dizziness or paresthesias  PSYCHIATRIC: NEGATIVE for changes in mood or affect    OBJECTIVE:   /69 (Cuff Size: Adult Regular)  Pulse 72  Temp 98.6  F (37  C) (Oral)  Resp 12  Ht 5' 3.5\" (1.613 m)  Wt 180 lb (81.6 kg)  SpO2 98%  BMI 31.39 kg/m2    Physical Exam  GENERAL: " "healthy, alert and no distress  EYES: Eyes grossly normal to inspection, PERRL and conjunctivae and sclerae normal  HENT: ear canals and TM's normal, nose and mouth without ulcers or lesions  NECK: no adenopathy, no asymmetry, masses, or scars and thyroid normal to palpation  RESP: lungs clear to auscultation - no rales, rhonchi or wheezes  CV: regular rate and rhythm, normal S1 S2, no S3 or S4, no murmur, click or rub, no peripheral edema and peripheral pulses strong  ABDOMEN: soft, nontender, no hepatosplenomegaly, no masses and bowel sounds normal   (male): normal male genitalia without lesions or urethral discharge, no hernia  MS: no gross musculoskeletal defects noted, no edema  SKIN: no suspicious lesions or rashes  NEURO: Normal strength and tone, mentation intact and speech normal  PSYCH: mentation appears normal, affect normal/bright        ASSESSMENT/PLAN:     COUNSELING:   Reviewed preventive health counseling, as reflected in patient instructions       Regular exercise       Healthy diet/nutrition    BP Readings from Last 1 Encounters:   12/11/17 126/74     Estimated body mass index is 32.08 kg/(m^2) as calculated from the following:    Height as of 5/12/17: 5' 3.5\" (1.613 m).    Weight as of 12/11/17: 184 lb (83.5 kg).      Weight management plan: Discussed healthy diet and exercise guidelines and patient will follow up in 12 months in clinic to re-evaluate.     reports that he has never smoked. He has never used smokeless tobacco.      Assessment and Plan - Decision Making    1. Routine general medical examination at a health care facility  Results of today's exam given to patient verbally along with age appropriate preventive measures. Written instructions reviewed and handed to the patient.      2. Abdominal pain, generalized  Per HPI  - praziquantel (BILTRICIDE) 600 MG tablet; Take once  Dispense: 1 tablet; Refill: 0      Written instructions given as follows:    Patient Instructions   1. I " recommend including veggies, fresh fruits (3 to 5 servings), nuts (unsalted) in your daily diet. Cut down on carbs like bread, pasta, rice, potatoes. Cut down on red meats like burgers etc. Increase healthy proteins like beans, tofu, tuna, chicken and turkey.    2. Get regular exercise like jogging, biking, swimming at least 3 times per week.      3. See the dentist and eye doctor regularly.     4. Let me see you back in one year for a physical with fasting labs.

## 2018-07-23 NOTE — NURSING NOTE
"Chief Complaint   Patient presents with     Physical       Initial /69 (Cuff Size: Adult Regular)  Pulse 72  Temp 98.6  F (37  C) (Oral)  Resp 12  Ht 5' 3.5\" (1.613 m)  Wt 180 lb (81.6 kg)  SpO2 98%  BMI 31.39 kg/m2 Estimated body mass index is 31.39 kg/(m^2) as calculated from the following:    Height as of this encounter: 5' 3.5\" (1.613 m).    Weight as of this encounter: 180 lb (81.6 kg).      Blaire Beach LPN    "

## 2019-01-16 ENCOUNTER — OFFICE VISIT (OUTPATIENT)
Dept: OPTOMETRY | Facility: CLINIC | Age: 44
End: 2019-01-16
Payer: COMMERCIAL

## 2019-01-16 DIAGNOSIS — H52.4 PRESBYOPIA: ICD-10-CM

## 2019-01-16 DIAGNOSIS — H52.222 REGULAR ASTIGMATISM, LEFT EYE: Primary | ICD-10-CM

## 2019-01-16 PROCEDURE — 92004 COMPRE OPH EXAM NEW PT 1/>: CPT | Performed by: OPTOMETRIST

## 2019-01-16 PROCEDURE — 92015 DETERMINE REFRACTIVE STATE: CPT | Performed by: OPTOMETRIST

## 2019-01-16 ASSESSMENT — KERATOMETRY
OS_K2POWER_DIOPTERS: 40.75
OS_AXISANGLE2_DEGREES: 180
OD_AXISANGLE2_DEGREES: 166
OD_K2POWER_DIOPTERS: 41.25
OD_K1POWER_DIOPTERS: 41.00
OS_K1POWER_DIOPTERS: 40.75

## 2019-01-16 ASSESSMENT — TONOMETRY
IOP_METHOD: APPLANATION
OS_IOP_MMHG: 12
OD_IOP_MMHG: 12

## 2019-01-16 ASSESSMENT — REFRACTION_MANIFEST
OS_AXIS: 030
OD_ADD: +1.25
OD_SPHERE: +0.25
OD_SPHERE: PLANO
OS_CYLINDER: +0.50
OS_CYLINDER: +0.50
OS_SPHERE: PLANO
OS_AXIS: 26
METHOD_AUTOREFRACTION: 1
OD_CYLINDER: SPHERE
OS_ADD: +1.25
OS_SPHERE: 0.00

## 2019-01-16 ASSESSMENT — CONF VISUAL FIELD
METHOD: COUNTING FINGERS
OS_NORMAL: 1
OD_NORMAL: 1

## 2019-01-16 ASSESSMENT — EXTERNAL EXAM - LEFT EYE: OS_EXAM: NORMAL

## 2019-01-16 ASSESSMENT — VISUAL ACUITY
OS_SC: 20/70+2
OS_SC: 20/30
OD_SC: 20/20
OS_SC+: -1
OD_SC: 20/50-1
METHOD: SNELLEN - LINEAR

## 2019-01-16 ASSESSMENT — SLIT LAMP EXAM - LIDS
COMMENTS: NORMAL
COMMENTS: NORMAL

## 2019-01-16 ASSESSMENT — EXTERNAL EXAM - RIGHT EYE: OD_EXAM: NORMAL

## 2019-01-16 ASSESSMENT — CUP TO DISC RATIO
OD_RATIO: 0.3
OS_RATIO: 0.3

## 2019-01-16 NOTE — PROGRESS NOTES
Chief Complaint   Patient presents with     Annual Eye Exam         Last Eye Exam: Lat one was when he was a child   Dilated Previously: No, side effects of dilation explained today    What are you currently using to see?  does not use glasses or contacts       Distance Vision Acuity: Satisfied with vision, no change or trouble     Near Vision Acuity: Satisfied with vision while reading and using computer unaided    Eye Comfort: good  Do you use eye drops? 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       OBJECTIVE: See Ophthalmology exam    ASSESSMENT:    ICD-10-CM    1. Regular astigmatism, left eye H52.222    2. Presbyopia H52.4       PLAN:     Patient Instructions   Patient was advised of today's exam findings.  Over the counter reading glasses as needed +1.25 readers  Return in 1-2 years for eye exam    Kaitlyn Guy O.D.  [facility]   [address] [place], [place] [id number] [phone]

## 2019-01-16 NOTE — LETTER
1/16/2019         RE: Aubree Le  [address]  [place] [address]        Dear Colleague,    Thank you for referring your patient, Aubree Le, to the [facility]. Please see a copy of my visit note below.    Chief Complaint   Patient presents with     Annual Eye Exam         Last Eye Exam: Lat one was when he was a child   Dilated Previously: No, side effects of dilation explained today    What are you currently using to see?  does not use glasses or contacts       Distance Vision Acuity: Satisfied with vision, no change or trouble     Near Vision Acuity: Satisfied with vision while reading and using computer unaided    Eye Comfort: good  Do you use eye drops? 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       OBJECTIVE: See Ophthalmology exam    ASSESSMENT:    ICD-10-CM    1. Regular astigmatism, left eye H52.222    2. Presbyopia H52.4       PLAN:     Patient Instructions   Patient was advised of today's exam findings.  Over the counter reading glasses as needed +1.25 readers  Return in 1-2 years for eye exam    Kaitlyn Guy O.D.  [facility]   [address] [place], [place] [id number] [phone]           Again, thank you for allowing me to participate in the care of your patient.        Sincerely,        Kaitlyn Guy, OD

## 2019-01-16 NOTE — PATIENT INSTRUCTIONS
Patient was advised of today's exam findings.  Over the counter reading glasses as needed +1.25 readers  Return in 1-2 years for eye exam    Kaitlyn Guy O.D.  [facility]   [address] [place] [address] [phone]

## 2020-03-10 ENCOUNTER — HEALTH MAINTENANCE LETTER (OUTPATIENT)
Age: 45
End: 2020-03-10

## 2020-12-27 ENCOUNTER — HEALTH MAINTENANCE LETTER (OUTPATIENT)
Age: 45
End: 2020-12-27

## 2021-04-24 ENCOUNTER — HEALTH MAINTENANCE LETTER (OUTPATIENT)
Age: 46
End: 2021-04-24

## 2021-10-09 ENCOUNTER — HEALTH MAINTENANCE LETTER (OUTPATIENT)
Age: 46
End: 2021-10-09

## 2022-05-16 ENCOUNTER — HEALTH MAINTENANCE LETTER (OUTPATIENT)
Age: 47
End: 2022-05-16

## 2022-09-11 ENCOUNTER — HEALTH MAINTENANCE LETTER (OUTPATIENT)
Age: 47
End: 2022-09-11

## 2022-12-21 NOTE — TELEPHONE ENCOUNTER
Called to check on the status of PA -was on hold for 10 minutes and had to hang up. I will try on Monday.Deanna Cornejo MA/TC    
Is there an update on Prior Auth status??  Please update pharmacy    Thank You  Jamila Knox, [facility]  [phone]    
PA did not go through. Tried again through Covermymeds-(Peters: [id number])  Deanna Cornejo MA/SHERRY    
PA done through Covermymeds. (Key: [id number]) Will await response.Deanna Cornejo MA/SHERRY      
PA phone # [phone]    Triamcinolone paste is not on the formulary    Medications on the formulary are:    Triamcinolone cream or gel    Clobetasol cream or gel    Fluocinonice cream or gel    Please advise.    Deanna Cornejo MA/TC    
Please call him and ask him to try Orajel - sent to pharmacy.    Kareem Charles M.D.    
Prior Authorizion is required for: Triamcinolone 0.1% paste  Patient Insurance: [facility]  Insurance Phone number: [phone]  Patient ID#: [id number]    Please contact pharmacy with approval or denial.    Thank You    Jamila Knox, [facility]  [phone]    
Sent [employer] message.Deanna Cornejo MA/SHERRY    
Patient/Mother

## 2023-06-03 ENCOUNTER — HEALTH MAINTENANCE LETTER (OUTPATIENT)
Age: 48
End: 2023-06-03